# Patient Record
Sex: MALE | URBAN - METROPOLITAN AREA
[De-identification: names, ages, dates, MRNs, and addresses within clinical notes are randomized per-mention and may not be internally consistent; named-entity substitution may affect disease eponyms.]

---

## 2024-01-29 ENCOUNTER — TRANSCRIBE ORDERS (OUTPATIENT)
Dept: SLEEP MEDICINE | Facility: HOSPITAL | Age: 44
End: 2024-01-29
Payer: OTHER GOVERNMENT

## 2024-01-29 DIAGNOSIS — G47.10 HYPERSOMNIA: Primary | ICD-10-CM

## 2024-02-06 ENCOUNTER — HOSPITAL ENCOUNTER (OUTPATIENT)
Dept: SLEEP MEDICINE | Facility: HOSPITAL | Age: 44
Discharge: HOME | End: 2024-02-06
Admitting: INTERNAL MEDICINE
Payer: OTHER GOVERNMENT

## 2024-02-06 DIAGNOSIS — G47.10 HYPERSOMNIA: ICD-10-CM

## 2024-02-06 PROCEDURE — 95810 POLYSOM 6/> YRS 4/> PARAM: CPT

## 2024-03-05 ENCOUNTER — TELEPHONE (OUTPATIENT)
Dept: SLEEP MEDICINE | Facility: HOSPITAL | Age: 44
End: 2024-03-05
Payer: OTHER GOVERNMENT

## 2025-01-28 ENCOUNTER — TRANSCRIBE ORDERS (OUTPATIENT)
Dept: ADMINISTRATIVE | Facility: HOSPITAL | Age: 45
End: 2025-01-28
Payer: OTHER GOVERNMENT

## 2025-01-28 DIAGNOSIS — M25.512 LEFT SHOULDER PAIN, UNSPECIFIED CHRONICITY: Primary | ICD-10-CM

## 2025-02-04 ENCOUNTER — HOSPITAL ENCOUNTER (OUTPATIENT)
Dept: MRI IMAGING | Facility: HOSPITAL | Age: 45
Discharge: HOME | End: 2025-02-04
Admitting: NURSE PRACTITIONER
Payer: OTHER GOVERNMENT

## 2025-02-04 DIAGNOSIS — M25.512 LEFT SHOULDER PAIN, UNSPECIFIED CHRONICITY: ICD-10-CM

## 2025-02-04 PROCEDURE — 73221 MRI JOINT UPR EXTREM W/O DYE: CPT

## 2025-03-18 ENCOUNTER — OFFICE VISIT (OUTPATIENT)
Dept: ORTHOPEDIC SURGERY | Facility: CLINIC | Age: 45
End: 2025-03-18
Payer: OTHER GOVERNMENT

## 2025-03-18 VITALS
WEIGHT: 190 LBS | DIASTOLIC BLOOD PRESSURE: 77 MMHG | HEIGHT: 72 IN | SYSTOLIC BLOOD PRESSURE: 113 MMHG | OXYGEN SATURATION: 96 % | HEART RATE: 64 BPM | BODY MASS INDEX: 25.73 KG/M2

## 2025-03-18 DIAGNOSIS — M25.512 LEFT SHOULDER PAIN, UNSPECIFIED CHRONICITY: Primary | ICD-10-CM

## 2025-03-18 DIAGNOSIS — M75.82 ROTATOR CUFF TENDONITIS, LEFT: ICD-10-CM

## 2025-03-18 RX ORDER — DICLOFENAC SODIUM 75 MG/1
75 TABLET, DELAYED RELEASE ORAL 2 TIMES DAILY
Qty: 60 TABLET | Refills: 1 | Status: SHIPPED | OUTPATIENT
Start: 2025-03-18

## 2025-03-18 RX ORDER — ESCITALOPRAM OXALATE 10 MG/1
TABLET ORAL
COMMUNITY
Start: 2025-01-21

## 2025-03-18 RX ORDER — DEXTROAMPHETAMINE SACCHARATE, AMPHETAMINE ASPARTATE MONOHYDRATE, DEXTROAMPHETAMINE SULFATE AND AMPHETAMINE SULFATE 6.25; 6.25; 6.25; 6.25 MG/1; MG/1; MG/1; MG/1
CAPSULE, EXTENDED RELEASE ORAL
COMMUNITY
Start: 2025-03-12

## 2025-03-18 NOTE — PROGRESS NOTES
"Chief Complaint  Initial Evaluation of the Left Shoulder     Subjective      Rudi Cevallos presents to [facility] for initial evaluation of the left shoulder.  He slipped and fell back in January and landed on the left elbow and jammed his left shoulder hard.  He had bruising in the pectoralis and is still having pain in the left shoulder.  He has increase movement of the shoulder since then.      No Known Allergies     Social History     Socioeconomic History    Marital status:    Tobacco Use    Smoking status: Never    Smokeless tobacco: Never   Vaping Use    Vaping status: Never Used   Substance and Sexual Activity    Alcohol use: Defer    Drug use: Defer    Sexual activity: Defer        I reviewed the patient's chief complaint, history of present illness, review of systems, past medical history, surgical history, family history, social history, medications, and allergy list.     Review of Systems     Constitutional: Denies fevers, chills, weight loss  Cardiovascular: Denies chest pain, shortness of breath  Skin: Denies rashes, acute skin changes  Neurologic: Denies headache, loss of consciousness        Vital Signs:   /77   Pulse 64   Ht 182.9 cm (72\")   Wt 86.2 kg (190 lb)   SpO2 96%   BMI 25.77 kg/m²          Physical Exam  General: Alert. No acute distress    Ortho Exam        LEFT SHOULDER Full ROM of the shoulder.  Positive Cross body adduction. Supraspinatus strength 5/5. Infraspinatus Strength 5/5. Infrared subscap 5/5. Positive Lo. Positive Neer. Negative Apprehension. Negative Lift off. (Negative Obriens. Sensation intact to light touch, median, radial, ulnar nerve. Positive AIN, PIN, ulnar nerve motor. Positive pulses. Positive Impingement signs. Good strength in triceps, biceps, deltoid, wrist extensors and wrist flexors. Tender to palpation to the anterior aspect of the shoulder and down the arm.         Procedures      Imaging Results (Most " Recent)       None             Result Review :          Narrative & Impression   MRI SHOULDER LEFT WO CONTRAST     Date of Exam: 2/4/2025 11:30 AM EST     Indication: LEFT SHOULDER PAIN.     Comparison: None available.     Technique:  Routine multiplanar/multisequence images of the left shoulder were obtained without contrast administration.          Findings:  No shoulder x-rays are available for comparison at time of this dictation.     Mild tendinosis of the supraspinatus tendon without discrete rotator cuff tendon tear. There are mild enthesopathic changes at the greater tuberosity. Normal bulk and signal intensity of the rotator cuff muscle bellies.     The long head of the biceps tendon is intact and normally positioned within the intertubercular groove.     There is normal alignment of the glenohumeral joint with a physiologic amount of joint fluid. The glenohumeral articular cartilage appears grossly preserved. Mild increased signal and subtle irregularity of the superior labrum which may reflect some   degenerative fraying or small labral tearing (for example series 9 image 13-14).     The acromioclavicular joint is congruent. No os acromiale. There is some edema of the AC joint capsule. There is marrow edema of the distal clavicle without discrete erosion. No apparent edema of the acromion at the AC joint.     No fracture. No focal bony lesion. No evidence of muscle strain.     IMPRESSION:  Impression:  Mild tendinosis of the supraspinatus tendon without discrete rotator cuff tendon tear.        There is marrow edema of the distal clavicle, a finding which can be seen with distal clavicular osteolysis and correlate for any repetitive overuse or trauma.        Mild increased signal and subtle irregularity of the superior labrum which may reflect some degenerative fraying or small labral tearing.        Assessment and Plan     Diagnoses and all orders for this visit:    1. Left shoulder pain, unspecified  chronicity (Primary)    2. Rotator cuff tendonitis, left        Discussed the treatment plan with the patient. I reviewed the MRI results with the patient.      HEP exercises given.  Prescribed anti inflammatory.      Discussed injection if those conservative measures aren't helpful.       Call or return if worsening symptoms.    Follow Up     PRN      Patient was given instructions and counseling regarding his condition or for health maintenance advice. Please see specific information pulled into the AVS if appropriate.     Scribed for Teresa Rios MD by Giovanna Genao MA.  03/18/25   10:48 EDT    I have personally performed the services described in this document as scribed by the above individual and it is both accurate and complete. Teresa Rios MD 03/18/25